# Patient Record
Sex: MALE | Race: WHITE | Employment: FULL TIME | URBAN - METROPOLITAN AREA
[De-identification: names, ages, dates, MRNs, and addresses within clinical notes are randomized per-mention and may not be internally consistent; named-entity substitution may affect disease eponyms.]

---

## 2019-05-02 ENCOUNTER — PROCEDURE VISIT (OUTPATIENT)
Dept: DERMATOLOGY | Age: 39
End: 2019-05-02

## 2019-05-02 DIAGNOSIS — Z41.1 ELECTIVE PROCEDURE FOR UNACCEPTABLE COSMETIC APPEARANCE: Primary | ICD-10-CM

## 2019-05-02 PROCEDURE — MISCHAIR22: Performed by: DERMATOLOGY

## 2019-05-02 PROCEDURE — 99999 PR OFFICE/OUTPT VISIT,PROCEDURE ONLY: CPT | Performed by: DERMATOLOGY

## 2019-05-03 NOTE — PROGRESS NOTES
Laser Procedure Note       Mayuri Purpura   YOB: 1980    DATE OF VISIT:  5/2/2019  Chief Complaint   Patient presents with    Laser Treatment     LASER: Joy  DIAGNOSIS: hair removal - back    1 previous trx several years ago. Not tan. We discussed treatment options, including no treatment as well as the following:  - need for multiple treatments and risk of incomplete clearance, recurrence  - risk of erythema, edema, purpura, dyspigmentation and scarring    PATIENT IDENTIFIED PER PROTOCOL: yes  LOCATION(S): back  VERIFIED AND MARKED: yes  TECHNIQUES, RISKS, BENEFITS AND ALTERNATIVES EXPLAINED: yes  CONSENT SIGNED, WITNESSED AND DATED: yes        OPERATIVE REPORT    DIAGNOSIS, LOCATION, PROCEDURE RECONFIRMED: yes   APPROPRIATE EYE PROTECTION for PATIENT: yes  APPROPRIATE EYE PROTECTION for PROVIDER and OTHERS PRESENT: yes  ANESTHESIA/PRE-OP MEDICATIONS: none  LASER SETTINGS:  (1)  WAVELENGTH: 755 - GL LENS: 18 mm  FLUENCE: 20 J   COOLING: ++    PROCEDURE NOTE:  Hair trimmed and back treated with vaporization of hairs and erythema at endpoint. POST-OPERATIVE CARE/DISPOSITION: ice pack prn  COMPLICATIONS: none  MEDICATIONS: none  WOUND CARE INSTRUCTIONS PROVIDED: yes    F/u prn. No charge - family.

## 2021-03-05 ENCOUNTER — IMMUNIZATION (OUTPATIENT)
Dept: PRIMARY CARE CLINIC | Age: 41
End: 2021-03-05
Payer: COMMERCIAL

## 2021-03-05 PROCEDURE — 91300 COVID-19, PFIZER VACCINE 30MCG/0.3ML DOSE: CPT | Performed by: FAMILY MEDICINE

## 2021-03-05 PROCEDURE — 0001A COVID-19, PFIZER VACCINE 30MCG/0.3ML DOSE: CPT | Performed by: FAMILY MEDICINE

## 2021-03-26 ENCOUNTER — IMMUNIZATION (OUTPATIENT)
Dept: PRIMARY CARE CLINIC | Age: 41
End: 2021-03-26
Payer: COMMERCIAL

## 2021-03-26 PROCEDURE — 0002A COVID-19, PFIZER VACCINE 30MCG/0.3ML DOSE: CPT | Performed by: FAMILY MEDICINE

## 2021-03-26 PROCEDURE — 91300 COVID-19, PFIZER VACCINE 30MCG/0.3ML DOSE: CPT | Performed by: FAMILY MEDICINE
